# Patient Record
Sex: MALE | Race: WHITE | NOT HISPANIC OR LATINO | Employment: OTHER | URBAN - METROPOLITAN AREA
[De-identification: names, ages, dates, MRNs, and addresses within clinical notes are randomized per-mention and may not be internally consistent; named-entity substitution may affect disease eponyms.]

---

## 2021-03-05 ENCOUNTER — OFFICE VISIT (OUTPATIENT)
Dept: OBGYN CLINIC | Facility: CLINIC | Age: 61
End: 2021-03-05
Payer: MEDICARE

## 2021-03-05 VITALS
WEIGHT: 183 LBS | DIASTOLIC BLOOD PRESSURE: 71 MMHG | BODY MASS INDEX: 24.79 KG/M2 | HEART RATE: 74 BPM | SYSTOLIC BLOOD PRESSURE: 110 MMHG | HEIGHT: 72 IN

## 2021-03-05 DIAGNOSIS — M72.0 DUPUYTREN'S CONTRACTURE OF RIGHT HAND: Primary | ICD-10-CM

## 2021-03-05 PROCEDURE — 99203 OFFICE O/P NEW LOW 30 MIN: CPT | Performed by: ORTHOPAEDIC SURGERY

## 2021-03-05 RX ORDER — ASPIRIN 81 MG/1
81 TABLET ORAL DAILY
COMMUNITY

## 2021-03-05 RX ORDER — LISINOPRIL 5 MG/1
5 TABLET ORAL DAILY
COMMUNITY

## 2021-03-05 RX ORDER — METOPROLOL SUCCINATE 100 MG/1
100 TABLET, EXTENDED RELEASE ORAL DAILY
COMMUNITY

## 2021-03-05 RX ORDER — OXYCODONE AND ACETAMINOPHEN 10; 325 MG/1; MG/1
1 TABLET ORAL 3 TIMES DAILY
COMMUNITY

## 2021-03-05 RX ORDER — AMOXICILLIN 500 MG
CAPSULE ORAL 2 TIMES DAILY
COMMUNITY

## 2021-03-05 RX ORDER — WARFARIN SODIUM 2.5 MG/1
TABLET ORAL
COMMUNITY

## 2021-03-05 RX ORDER — ATORVASTATIN CALCIUM 40 MG/1
40 TABLET, FILM COATED ORAL DAILY
COMMUNITY

## 2021-03-05 RX ORDER — DIGOXIN 125 MCG
125 TABLET ORAL DAILY
COMMUNITY

## 2021-03-05 NOTE — PROGRESS NOTES
Assessment/Plan:  1  Dupuytren's contracture of right hand  Injection procedure prior authorization       Scribe Attestation    I,:  Belle Laureano MA am acting as a scribe while in the presence of the attending physician :       I,:  Lieutenant Jb DO personally performed the services described in this documentation    as scribed in my presence  :             53-year-old male with dupuytren's contracture  Patient was provided with a hand out on this in the office today  Patient does have a 50 degree MCP contracture right ring finger  He also has a 25 degree PIP contracture of his right small finger  Treatment options were discussed in the form of Xiaflex injection for his right ring finger  I discussed with him today for the small finger that I do not do Xiaflex injections for PIP contractures and treatment for this would be surgical intervention  Patient would like to hold off on surgical intervention at this time  He elected to proceed with Xiaflex injection for his right ring finger MCP contracture  A order was placed for this today  He will follow up once the [facility] is approved  Patient will schedule injection in [place] on a Tuesday and manipulation in [place] on a Friday  We did discuss night time splinting for 3 months after manipulation  Subjective:   Sergio Klein is a 64 y o  male who presents to the office today for evaluation right hand contracture  Patient notes a contracture to his right ring finger  He states this has been ongoing for many years  He states he is unable to straighten the ring or small finger  He states his father had the same issue  He denies any injury or trauma  Review of Systems   Constitutional: Negative for chills and fever  HENT: Negative for drooling and sneezing  Eyes: Negative for redness  Respiratory: Negative for cough and wheezing  Gastrointestinal: Negative for nausea and vomiting     Musculoskeletal: Negative for arthralgias, joint swelling and myalgias  Neurological: Negative for weakness and numbness  Psychiatric/Behavioral: Negative for behavioral problems  The patient is not nervous/anxious  Past Medical History:   Diagnosis Date    Atrial fibrillation (HCC)     Cardiomyopathy ([address] )     Congestive heart failure (CHF) (HCC)     Heart disease        Past Surgical History:   Procedure Laterality Date    ATRIAL CARDIAC PACEMAKER INSERTION      BACK SURGERY      x2     CAROTID STENT      HERNIA REPAIR      KNEE SURGERY      meniscus        History reviewed  No pertinent family history      Social History     Occupational History    Not on file   Tobacco Use    Smoking status: Current Every Day Smoker     Types: Cigars    Smokeless tobacco: Never Used   Substance and Sexual Activity    Alcohol use: Not Currently    Drug use: Never    Sexual activity: Not on file         Current Outpatient Medications:     aspirin (ECOTRIN LOW STRENGTH) 81 mg EC tablet, Take 81 mg by mouth daily, Disp: , Rfl:     atorvastatin (LIPITOR) 40 mg tablet, Take 40 mg by mouth daily, Disp: , Rfl:     digoxin (LANOXIN) 0 125 mg tablet, Take 125 mcg by mouth daily, Disp: , Rfl:     lisinopril (ZESTRIL) 5 mg tablet, Take 5 mg by mouth daily, Disp: , Rfl:     metoprolol succinate (TOPROL-XL) 100 mg 24 hr tablet, Take 100 mg by mouth daily 1 5 in am and 1 at night, Disp: , Rfl:     Omega-3 Fatty Acids (Fish Oil) 1200 MG CAPS, Take by mouth 2 (two) times a day, Disp: , Rfl:     oxyCODONE-acetaminophen (PERCOCET)  mg per tablet, Take 1 tablet by mouth 3 (three) times a day, Disp: , Rfl:     warfarin (COUMADIN) 2 5 mg tablet, Take by mouth daily 1-2 a day as directed by MD, Disp: , Rfl:     No Known Allergies    Objective:  Vitals:    03/05/21 0901   BP: 110/71   Pulse: 74       Ortho Exam     Right hand    Pretendinous cord ring finger   Ring finger MCP 50 deg  No PIP contracture ring finger   Small PIP contracture 25 degree  Spiral cord small finger  No pretendinous cord small finger  Early abductor small finger   FDS FDP ext intact  + table top test  Compartments soft  Brisk capillary refill  S/m intact median, radial    Physical Exam  Constitutional:       Appearance: He is well-developed  HENT:      Head: Normocephalic and atraumatic  Eyes:      General:         Right eye: No discharge  Left eye: No discharge  Conjunctiva/sclera: Conjunctivae normal    Neck:      Musculoskeletal: Normal range of motion and neck supple  Cardiovascular:      Rate and Rhythm: Normal rate  Pulmonary:      Effort: Pulmonary effort is normal  No respiratory distress  Musculoskeletal:      Comments: As noted in HPI   Skin:     General: Skin is warm and dry  Neurological:      Mental Status: He is alert and oriented to person, place, and time  Psychiatric:         Behavior: Behavior normal          Thought Content:  Thought content normal          Judgment: Judgment normal

## 2021-03-17 ENCOUNTER — TELEPHONE (OUTPATIENT)
Dept: OBGYN CLINIC | Facility: CLINIC | Age: 61
End: 2021-03-17

## 2021-03-18 NOTE — TELEPHONE ENCOUNTER
Lm with sig other advising to have him call back and schedule xiaflex appointments with Dr Bailon      He will need to be seen in [facility] on a Monday or Tuesday   If seen on Monday he will follow up that same week in [place] on that Thursday for manipulation      If seen on Tuesday he will follow up that Friday in [place] for manipulation

## 2021-03-19 NOTE — TELEPHONE ENCOUNTER
Patient's wife Toribio Winters called to schedule Xiaflex injection for patient Declan Vianey     # [phone]

## 2021-03-22 NOTE — TELEPHONE ENCOUNTER
lmom to schedule   If calls back PLEASE schedule appointments  They will be considered a visco appt       Follow guidelines from prev message on how to schedule the f/us

## 2021-04-06 ENCOUNTER — OFFICE VISIT (OUTPATIENT)
Dept: OBGYN CLINIC | Facility: CLINIC | Age: 61
End: 2021-04-06
Payer: MEDICARE

## 2021-04-06 DIAGNOSIS — M72.0 DUPUYTREN'S CONTRACTURE OF RIGHT HAND: Primary | ICD-10-CM

## 2021-04-06 PROCEDURE — 20527 NJX NZM PALMAR FASCIAL CORD: CPT | Performed by: ORTHOPAEDIC SURGERY

## 2021-04-06 PROCEDURE — 99213 OFFICE O/P EST LOW 20 MIN: CPT | Performed by: ORTHOPAEDIC SURGERY

## 2021-04-06 NOTE — PROGRESS NOTES
Assessment/Plan:  1  Dupuytren's contracture of right hand  Hand/upper extremity injection: R ring finger    Ambulatory referral to PT/OT hand therapy       Scribe Attestation    I,:  Belle Laureano MA am acting as a scribe while in the presence of the attending physician :       I,:  Rodolfo Madsen DO personally performed the services described in this documentation    as scribed in my presence  :             79-year-old male who presents to the office today for right ring finger Xiaflex injection  This was performed in the office today without any complications  We did discuss night time bracing for three months after the manipulation  A appointment was scheduled and the patient will proceed to the therapy office for custom brace following the manipulation  He will follow up on Friday for Xiaflex manipulation  Subjective:   Karmen Diehl is a 64 y o  male who presents to the office today for follow up evaluation right ring MCP dupuytren's contracture  He presents to the office today for right ring finger Xiaflex injection  Review of Systems   Constitutional: Negative for chills and fever  HENT: Negative for drooling and sneezing  Eyes: Negative for redness  Respiratory: Negative for cough and wheezing  Gastrointestinal: Negative for nausea and vomiting  Musculoskeletal: Negative for arthralgias, joint swelling and myalgias  Neurological: Negative for weakness and numbness  Psychiatric/Behavioral: Negative for behavioral problems  The patient is not nervous/anxious  Past Medical History:   Diagnosis Date    Atrial fibrillation (HCC)     Cardiomyopathy ([address] )     Congestive heart failure (CHF) (HCC)     Heart disease        Past Surgical History:   Procedure Laterality Date    ATRIAL CARDIAC PACEMAKER INSERTION      BACK SURGERY      x2     CAROTID STENT      HERNIA REPAIR      KNEE SURGERY      meniscus        History reviewed  No pertinent family history      Social History     Occupational History    Not on file   Tobacco Use    Smoking status: Current Every Day Smoker     Types: Cigars    Smokeless tobacco: Never Used   Substance and Sexual Activity    Alcohol use: Not Currently    Drug use: Never    Sexual activity: Not on file         Current Outpatient Medications:     aspirin (ECOTRIN LOW STRENGTH) 81 mg EC tablet, Take 81 mg by mouth daily, Disp: , Rfl:     atorvastatin (LIPITOR) 40 mg tablet, Take 40 mg by mouth daily, Disp: , Rfl:     digoxin (LANOXIN) 0 125 mg tablet, Take 125 mcg by mouth daily, Disp: , Rfl:     lisinopril (ZESTRIL) 5 mg tablet, Take 5 mg by mouth daily, Disp: , Rfl:     metoprolol succinate (TOPROL-XL) 100 mg 24 hr tablet, Take 100 mg by mouth daily 1 5 in am and 1 at night, Disp: , Rfl:     Omega-3 Fatty Acids (Fish Oil) 1200 MG CAPS, Take by mouth 2 (two) times a day, Disp: , Rfl:     oxyCODONE-acetaminophen (PERCOCET)  mg per tablet, Take 1 tablet by mouth 3 (three) times a day, Disp: , Rfl:     warfarin (COUMADIN) 2 5 mg tablet, Take by mouth daily 1-2 a day as directed by MD, Disp: , Rfl:     No Known Allergies    Objective: There were no vitals filed for this visit  Ortho Exam     Right ring finger    50 degree MCP contracture   + table top  Compartments soft  Brisk capillary refill  S/m intact median, radial, and ulnar nerve     Physical Exam  Constitutional:       Appearance: He is well-developed  HENT:      Head: Normocephalic and atraumatic  Eyes:      General:         Right eye: No discharge  Left eye: No discharge  Conjunctiva/sclera: Conjunctivae normal    Neck:      Musculoskeletal: Normal range of motion and neck supple  Cardiovascular:      Rate and Rhythm: Normal rate  Pulmonary:      Effort: Pulmonary effort is normal  No respiratory distress  Musculoskeletal:      Comments: As noted in HPI   Skin:     General: Skin is warm and dry     Neurological:      Mental Status: He is alert and oriented to person, place, and time  Psychiatric:         Behavior: Behavior normal          Thought Content: Thought content normal          Judgment: Judgment normal      Hand/upper extremity injection: R ring finger  Universal Protocol:  Consent: Verbal consent obtained    Consent given by: patient  Patient identity confirmed: verbally with patient    Supporting Documentation  Indications: pain   Procedure Details  Condition:Dupuytren's contracture Dupuytren's Contracture Procedure: Dupuytren's contracture injectionSite: R ring finger   Preparation: Patient was prepped and draped in the usual sterile fashion  Ultrasound guidance: no  Medications administered: 0 58 mg collagenase clostridium histolyticum 0 9 MG    Patient tolerance: patient tolerated the procedure well with no immediate complications  Dressing:  Sterile dressing applied

## 2021-04-09 ENCOUNTER — OFFICE VISIT (OUTPATIENT)
Dept: OCCUPATIONAL THERAPY | Facility: CLINIC | Age: 61
End: 2021-04-09
Payer: MEDICARE

## 2021-04-09 ENCOUNTER — OFFICE VISIT (OUTPATIENT)
Dept: OBGYN CLINIC | Facility: CLINIC | Age: 61
End: 2021-04-09
Payer: MEDICARE

## 2021-04-09 DIAGNOSIS — M72.0 DUPUYTREN'S CONTRACTURE OF RIGHT HAND: Primary | ICD-10-CM

## 2021-04-09 DIAGNOSIS — M72.0 DUPUYTREN'S CONTRACTURE: Primary | ICD-10-CM

## 2021-04-09 PROCEDURE — 97760 ORTHOTIC MGMT&TRAING 1ST ENC: CPT

## 2021-04-09 PROCEDURE — 26341 MANIPULAT PALM CORD POST INJ: CPT | Performed by: ORTHOPAEDIC SURGERY

## 2021-04-09 RX ORDER — LIDOCAINE HYDROCHLORIDE 10 MG/ML
0.5 INJECTION, SOLUTION INFILTRATION; PERINEURAL
Status: COMPLETED | OUTPATIENT
Start: 2021-04-09 | End: 2021-04-09

## 2021-04-09 RX ADMIN — LIDOCAINE HYDROCHLORIDE 0.5 ML: 10 INJECTION, SOLUTION INFILTRATION; PERINEURAL at 09:15

## 2021-04-09 NOTE — PROGRESS NOTES
Assessment/Plan:  1  Dupuytren's contracture of right hand  Hand/upper extremity injection: R ring finger       Scribe Attestation    I,:  Belle Laureano MA am acting as a scribe while in the presence of the attending physician :       I,:  Hannah Reagan DO personally performed the services described in this documentation    as scribed in my presence  :             53-year-old male who presents to the office today for [facility] manipulation  This was performed in the office today without any complications  Patient can get full extension  Patient will have his night time brace made by therapy today  He will wear this at night for the next 3 months  He may follow up with me as needed  Subjective:   Vanessa Smith is a 64 y o  male who presents to the office today for Xiaflex manipulation right ring finger MCP  Patient underwent Xiaflex injection at his last visit non 4/6/21  Review of Systems   Constitutional: Negative for chills and fever  HENT: Negative for drooling and sneezing  Eyes: Negative for redness  Respiratory: Negative for cough and wheezing  Gastrointestinal: Negative for nausea and vomiting  Musculoskeletal: Negative for arthralgias, joint swelling and myalgias  Neurological: Negative for weakness and numbness  Psychiatric/Behavioral: Negative for behavioral problems  The patient is not nervous/anxious  Past Medical History:   Diagnosis Date    Atrial fibrillation (HCC)     Cardiomyopathy ([address] )     Congestive heart failure (CHF) (HCC)     Heart disease        Past Surgical History:   Procedure Laterality Date    ATRIAL CARDIAC PACEMAKER INSERTION      BACK SURGERY      x2     CAROTID STENT      HERNIA REPAIR      KNEE SURGERY      meniscus        No family history on file      Social History     Occupational History    Not on file   Tobacco Use    Smoking status: Current Every Day Smoker     Types: Cigars    Smokeless tobacco: Never Used   Substance and Sexual Activity    Alcohol use: Not Currently    Drug use: Never    Sexual activity: Not on file         Current Outpatient Medications:     aspirin (ECOTRIN LOW STRENGTH) 81 mg EC tablet, Take 81 mg by mouth daily, Disp: , Rfl:     atorvastatin (LIPITOR) 40 mg tablet, Take 40 mg by mouth daily, Disp: , Rfl:     digoxin (LANOXIN) 0 125 mg tablet, Take 125 mcg by mouth daily, Disp: , Rfl:     lisinopril (ZESTRIL) 5 mg tablet, Take 5 mg by mouth daily, Disp: , Rfl:     metoprolol succinate (TOPROL-XL) 100 mg 24 hr tablet, Take 100 mg by mouth daily 1 5 in am and 1 at night, Disp: , Rfl:     Omega-3 Fatty Acids (Fish Oil) 1200 MG CAPS, Take by mouth 2 (two) times a day, Disp: , Rfl:     oxyCODONE-acetaminophen (PERCOCET)  mg per tablet, Take 1 tablet by mouth 3 (three) times a day, Disp: , Rfl:     warfarin (COUMADIN) 2 5 mg tablet, Take by mouth daily 1-2 a day as directed by MD, Disp: , Rfl:     No Known Allergies    Objective: There were no vitals filed for this visit  Ortho Exam     Right ring finger    FDS FDP ext intact   No wounds   Audible pop  Full ext MCP  FDS FDP ext intact post manipulation   Compartments soft  Brisk capillary refill  S/m intact median, radial, and ulnar nerve prior to lidocaine    Physical Exam  Constitutional:       Appearance: He is well-developed  HENT:      Head: Normocephalic and atraumatic  Eyes:      General:         Right eye: No discharge  Left eye: No discharge  Conjunctiva/sclera: Conjunctivae normal    Neck:      Musculoskeletal: Normal range of motion and neck supple  Cardiovascular:      Rate and Rhythm: Normal rate  Pulmonary:      Effort: Pulmonary effort is normal  No respiratory distress  Musculoskeletal:      Comments: As noted in HPI   Skin:     General: Skin is warm and dry  Neurological:      Mental Status: He is alert and oriented to person, place, and time     Psychiatric:         Behavior: Behavior normal  Thought Content: Thought content normal          Judgment: Judgment normal      Hand/upper extremity injection: R ring finger  Universal Protocol:  Consent: Verbal consent obtained    Consent given by: patient  Patient identity confirmed: verbally with patient    Supporting Documentation  Indications: pain   Procedure Details  Condition:Dupuytren's contracture Dupuytren's Contracture Procedure: Dupuytren's contracture manipulationSite: R ring finger   Preparation: Patient was prepped and draped in the usual sterile fashion  Needle size: 27 G  Ultrasound guidance: no  Medications administered: 0 5 mL lidocaine 1 %    Patient tolerance: patient tolerated the procedure well with no immediate complications  Dressing:  Sterile dressing applied

## 2021-04-09 NOTE — PROGRESS NOTES
Orthosis    Diagnosis:   1  Dupuytren's contracture       Indication: Motion Blocking    Location: Right  hand  Supplies: Custom Fit Orthotic  Orthosis type: night time extension splint  Wearing Schedule: Night only  Describe Position: digits extended    Precautions: Universal (skin contact/breakdown)    Patient or Caregiver expresses understanding of wearing Schedule and Precautions? Yes  Patient or Caregiver able to don/doff orthotic independently? Yes    Written orders provided to patient?  No  Orders Obtained: Written  Orders Obtained from: Dr Jacki Dixon    Return for evaluation and treatment No

## 2021-04-13 ENCOUNTER — APPOINTMENT (OUTPATIENT)
Dept: OCCUPATIONAL THERAPY | Facility: CLINIC | Age: 61
End: 2021-04-13
Payer: MEDICARE

## 2025-05-19 ENCOUNTER — HOSPITAL ENCOUNTER (EMERGENCY)
Facility: HOSPITAL | Age: 65
Discharge: HOME | End: 2025-05-19
Attending: EMERGENCY MEDICINE
Payer: COMMERCIAL

## 2025-05-19 ENCOUNTER — APPOINTMENT (EMERGENCY)
Dept: RADIOLOGY | Facility: HOSPITAL | Age: 65
End: 2025-05-19
Payer: COMMERCIAL

## 2025-05-19 VITALS
BODY MASS INDEX: 21.02 KG/M2 | SYSTOLIC BLOOD PRESSURE: 124 MMHG | DIASTOLIC BLOOD PRESSURE: 73 MMHG | TEMPERATURE: 97.9 F | HEART RATE: 70 BPM | RESPIRATION RATE: 20 BRPM | OXYGEN SATURATION: 97 % | WEIGHT: 155 LBS

## 2025-05-19 DIAGNOSIS — L03.90 CELLULITIS: ICD-10-CM

## 2025-05-19 DIAGNOSIS — L02.91 ABSCESS: ICD-10-CM

## 2025-05-19 DIAGNOSIS — N49.2 SCROTAL ABSCESS: Primary | ICD-10-CM

## 2025-05-19 LAB
ALBUMIN SERPL BCG-MCNC: 4.1 G/DL (ref 3.5–5)
ALP SERPL-CCNC: 67 U/L (ref 34–104)
ALT SERPL W P-5'-P-CCNC: 7 U/L (ref 7–52)
ANION GAP SERPL CALCULATED.3IONS-SCNC: 11 MMOL/L (ref 4–13)
AST SERPL W P-5'-P-CCNC: 12 U/L (ref 13–39)
BASOPHILS # BLD AUTO: 0.04 THOUSANDS/ÂΜL (ref 0–0.1)
BASOPHILS NFR BLD AUTO: 0 % (ref 0–1)
BILIRUB SERPL-MCNC: 0.53 MG/DL (ref 0.2–1)
BUN SERPL-MCNC: 9 MG/DL (ref 5–25)
CALCIUM SERPL-MCNC: 9 MG/DL (ref 8.4–10.2)
CHLORIDE SERPL-SCNC: 102 MMOL/L (ref 96–108)
CO2 SERPL-SCNC: 23 MMOL/L (ref 21–32)
CREAT SERPL-MCNC: 0.8 MG/DL (ref 0.6–1.3)
EOSINOPHIL # BLD AUTO: 0.44 THOUSAND/ÂΜL (ref 0–0.61)
EOSINOPHIL NFR BLD AUTO: 3 % (ref 0–6)
ERYTHROCYTE [DISTWIDTH] IN BLOOD BY AUTOMATED COUNT: 13.5 % (ref 11.6–15.1)
GFR SERPL CREATININE-BSD FRML MDRD: 95 ML/MIN/1.73SQ M
GLUCOSE SERPL-MCNC: 127 MG/DL (ref 65–140)
HCT VFR BLD AUTO: 40.7 % (ref 36.5–49.3)
HGB BLD-MCNC: 13.7 G/DL (ref 12–17)
IMM GRANULOCYTES # BLD AUTO: 0.05 THOUSAND/UL (ref 0–0.2)
IMM GRANULOCYTES NFR BLD AUTO: 0 % (ref 0–2)
LYMPHOCYTES # BLD AUTO: 2.72 THOUSANDS/ÂΜL (ref 0.6–4.47)
LYMPHOCYTES NFR BLD AUTO: 20 % (ref 14–44)
MCH RBC QN AUTO: 33.7 PG (ref 26.8–34.3)
MCHC RBC AUTO-ENTMCNC: 33.7 G/DL (ref 31.4–37.4)
MCV RBC AUTO: 100 FL (ref 82–98)
MONOCYTES # BLD AUTO: 0.86 THOUSAND/ÂΜL (ref 0.17–1.22)
MONOCYTES NFR BLD AUTO: 6 % (ref 4–12)
NEUTROPHILS # BLD AUTO: 9.87 THOUSANDS/ÂΜL (ref 1.85–7.62)
NEUTS SEG NFR BLD AUTO: 71 % (ref 43–75)
NRBC BLD AUTO-RTO: 0 /100 WBCS
PLATELET # BLD AUTO: 278 THOUSANDS/UL (ref 149–390)
PMV BLD AUTO: 9.4 FL (ref 8.9–12.7)
POTASSIUM SERPL-SCNC: 4.2 MMOL/L (ref 3.5–5.3)
PROT SERPL-MCNC: 7.6 G/DL (ref 6.4–8.4)
RBC # BLD AUTO: 4.07 MILLION/UL (ref 3.88–5.62)
SODIUM SERPL-SCNC: 136 MMOL/L (ref 135–147)
WBC # BLD AUTO: 13.98 THOUSAND/UL (ref 4.31–10.16)

## 2025-05-19 PROCEDURE — 99283 EMERGENCY DEPT VISIT LOW MDM: CPT

## 2025-05-19 PROCEDURE — 76870 US EXAM SCROTUM: CPT

## 2025-05-19 PROCEDURE — 36415 COLL VENOUS BLD VENIPUNCTURE: CPT | Performed by: EMERGENCY MEDICINE

## 2025-05-19 PROCEDURE — 80053 COMPREHEN METABOLIC PANEL: CPT | Performed by: EMERGENCY MEDICINE

## 2025-05-19 PROCEDURE — 99284 EMERGENCY DEPT VISIT MOD MDM: CPT | Performed by: EMERGENCY MEDICINE

## 2025-05-19 PROCEDURE — 96365 THER/PROPH/DIAG IV INF INIT: CPT

## 2025-05-19 PROCEDURE — 85025 COMPLETE CBC W/AUTO DIFF WBC: CPT | Performed by: EMERGENCY MEDICINE

## 2025-05-19 PROCEDURE — 96375 TX/PRO/DX INJ NEW DRUG ADDON: CPT

## 2025-05-19 PROCEDURE — 10060 I&D ABSCESS SIMPLE/SINGLE: CPT | Performed by: EMERGENCY MEDICINE

## 2025-05-19 RX ORDER — KETOROLAC TROMETHAMINE 30 MG/ML
15 INJECTION, SOLUTION INTRAMUSCULAR; INTRAVENOUS ONCE
Status: COMPLETED | OUTPATIENT
Start: 2025-05-19 | End: 2025-05-19

## 2025-05-19 RX ORDER — CEFADROXIL 500 MG/1
500 CAPSULE ORAL EVERY 12 HOURS SCHEDULED
Qty: 14 CAPSULE | Refills: 0 | Status: SHIPPED | OUTPATIENT
Start: 2025-05-19 | End: 2025-05-19

## 2025-05-19 RX ORDER — SULFAMETHOXAZOLE AND TRIMETHOPRIM 800; 160 MG/1; MG/1
1 TABLET ORAL ONCE
Status: COMPLETED | OUTPATIENT
Start: 2025-05-19 | End: 2025-05-19

## 2025-05-19 RX ORDER — CLINDAMYCIN HYDROCHLORIDE 300 MG/1
300 CAPSULE ORAL 3 TIMES DAILY
Qty: 30 CAPSULE | Refills: 0 | Status: SHIPPED | OUTPATIENT
Start: 2025-05-19 | End: 2025-05-29

## 2025-05-19 RX ORDER — ACETAMINOPHEN 10 MG/ML
1000 INJECTION, SOLUTION INTRAVENOUS ONCE
Status: COMPLETED | OUTPATIENT
Start: 2025-05-19 | End: 2025-05-19

## 2025-05-19 RX ORDER — CEFAZOLIN SODIUM 2 G/50ML
2000 SOLUTION INTRAVENOUS ONCE
Status: COMPLETED | OUTPATIENT
Start: 2025-05-19 | End: 2025-05-19

## 2025-05-19 RX ORDER — SULFAMETHOXAZOLE AND TRIMETHOPRIM 800; 160 MG/1; MG/1
1 TABLET ORAL 2 TIMES DAILY
Qty: 14 TABLET | Refills: 0 | Status: SHIPPED | OUTPATIENT
Start: 2025-05-19 | End: 2025-05-19

## 2025-05-19 RX ORDER — LIDOCAINE HYDROCHLORIDE AND EPINEPHRINE 10; 10 MG/ML; UG/ML
20 INJECTION, SOLUTION INFILTRATION; PERINEURAL ONCE
Status: COMPLETED | OUTPATIENT
Start: 2025-05-19 | End: 2025-05-19

## 2025-05-19 RX ADMIN — LIDOCAINE HYDROCHLORIDE,EPINEPHRINE BITARTRATE 20 ML: 10; .01 INJECTION, SOLUTION INFILTRATION; PERINEURAL at 12:09

## 2025-05-19 RX ADMIN — SULFAMETHOXAZOLE AND TRIMETHOPRIM 1 TABLET: 800; 160 TABLET ORAL at 11:58

## 2025-05-19 RX ADMIN — KETOROLAC TROMETHAMINE 15 MG: 30 INJECTION, SOLUTION INTRAMUSCULAR; INTRAVENOUS at 11:58

## 2025-05-19 RX ADMIN — CEFAZOLIN SODIUM 2000 MG: 2 SOLUTION INTRAVENOUS at 12:12

## 2025-05-19 RX ADMIN — ACETAMINOPHEN 1000 MG: 10 INJECTION INTRAVENOUS at 11:59

## 2025-05-19 NOTE — ED NOTES
Pt's spouse came out of room and states pt is dressed and ready to go home. States he doesn't want to wait anymore. Provider made aware.      Maria Esther Fletcher RN  05/19/25 5004

## 2025-05-19 NOTE — CONSULTS
H&P Exam - Urology       Patient: Johann Garcia   : 1960 Sex: male   MRN: [id number]     CSN: [id number]      History of Present Illness   HPI:  Johann Garcia is a 64 y.o. male who presents [facility] this afternoon with increasing swelling of the left hemiscrotum and thigh found on scrotal ultrasound to have localized left 4 cm scrotal abscess telephone call to my office informed that incision and drainage can be done in the ER told the patient to stay and will evaluate when done with office hours and perform incision and drainage 1 presenting to the ER patient was dressed and wished to be discharged home wanting to follow-up aware that this is a abscess that needs to be drained as soon as possible and could lead to bad infection if not opened        Review of Systems:   Constitutional:  Negative for activity change, fever, chills and diaphoresis.   HENT: Negative for hearing loss and trouble swallowing.   Eyes: Negative for itching and visual disturbance.   Respiratory: Negative for chest tightness and shortness of breath.   Cardiovascular: Negative for chest pain, edema.   Gastrointestinal: Negative for abdominal distention, na abdominal pain, constipation, diarrhea, Nausea and vomiting.   Genitourinary: Negative for decreased urine volume, difficulty urinating, dysuria, enuresis, frequency, hematuria and urgency.   Musculoskeletal: Negative for gait problem and myalgias.   Neurological: Negative for dizziness and headaches.   Hematological: Does not bruise/bleed easily.       Historical Information   Past Medical History:   Diagnosis Date    Atrial fibrillation (HCC)     Cardiomyopathy (HCC)     Congestive heart failure (CHF) (HCC)     Heart disease      Past Surgical History:   Procedure Laterality Date    ATRIAL CARDIAC PACEMAKER INSERTION      BACK SURGERY      x2     CAROTID STENT      HERNIA REPAIR      KNEE SURGERY      meniscus      Social History   Social History     Substance and  "Sexual Activity   Alcohol Use Not Currently     Social History     Substance and Sexual Activity   Drug Use Never     Tobacco Use History[1]  Family History: History reviewed. No pertinent family history.    Meds/Allergies   Not in a hospital admission.  Allergies[2]    Objective   Vitals: /73 (BP Location: Right arm)   Pulse 70   Temp 97.9 °F (36.6 °C) (Oral)   Resp 20   Wt 70.3 kg (155 lb)   SpO2 97%   BMI 21.02 kg/m²     Physical Exam:  General Alert awake   Normocephalic atraumatic PERRLA  Lungs clear bilaterally  Cardiac normal S1 normal S2  Abdomen soft, flank pain  Extremities no edema    No intake/output data recorded.    Invasive Devices       None                 Scrotal ultrasound 4 cm left abscess no communication with testicle      Lab Results: CBC:   Lab Results   Component Value Date    WBC 13.98 (H) 05/19/2025    HGB 13.7 05/19/2025    HCT 40.7 05/19/2025     (H) 05/19/2025     05/19/2025    RBC 4.07 05/19/2025    MCH 33.7 05/19/2025    MCHC 33.7 05/19/2025    RDW 13.5 05/19/2025    MPV 9.4 05/19/2025    NRBC 0 05/19/2025     CMP:   Lab Results   Component Value Date     05/19/2025    CO2 23 05/19/2025    BUN 9 05/19/2025    CREATININE 0.80 05/19/2025    CALCIUM 9.0 05/19/2025    AST 12 (L) 05/19/2025    ALT 7 05/19/2025    ALKPHOS 67 05/19/2025    EGFR 95 05/19/2025     Urinalysis: No results found for: \"COLORU\", \"CLARITYU\", \"SPECGRAV\", \"PHUR\", \"LEUKOCYTESUR\", \"NITRITE\", \"PROTEINUA\", \"GLUCOSEU\", \"KETONESU\", \"BILIRUBINUR\", \"BLOODU\"  Urine Culture: No results found for: \"URINECX\"  PSA: No results found for: \"PSA\"        Assessment/ Plan:  Left scrotal abscess  Patient refused to stay for incision and drainage to be discharged by the ER staff possibly following up in my office in the next 24 hours for incision and drainage  Aware risk of worsening infection which could lead to sepsis and/or Sybil's gangrene and needs to be followed up soon as possible      Juanjo " MD Flako         [1]   Social History  Tobacco Use   Smoking Status Every Day    Types: Cigars   Smokeless Tobacco Never   [2] No Known Allergies

## 2025-05-19 NOTE — Clinical Note
Date: 5/19/2025  Patient: Johann Garcia  Admitted: 5/19/2025 11:41 AM  Attending Provider: Wandy Kinsey DO    Johann Garcia or his authorized caregiver has made the decision for the patient to leave the emergency department against the advice of  dr kinsey. He or his authorized caregiver has been informed and understands the inherent risks, including death,  He or his authorized caregiver has decided to accept the responsibility for this decision. Johann Garcia and all necessary parties hav e been advised that he may return for further evaluation or treatment. His condition at time of discharge was stable. Johann Garcia had current vital signs as follows:  /73 (BP Location: Right arm)   Pulse 70   Temp 97.9 °F (36.6 °C) (Oral)    Resp 20   Wt 70.3 kg (155 lb)

## 2025-05-19 NOTE — ED NOTES
Patient advised to call outpatient urology to follow up on abscess.     Magi Gentile RN  05/19/25 5623

## 2025-05-19 NOTE — ED PROVIDER NOTES
Time reflects when diagnosis was documented in both MDM as applicable and the Disposition within this note       Time User Action Codes Description Comment    5/19/2025  5:04 PM AnepuPankaja Add [N49.2] Scrotal abscess     5/19/2025  5:04 PM Anepu, Wandy Add [L02.91] Abscess     5/19/2025  5:04 PM Anepu, Wandy Add [L03.90] Cellulitis           ED Disposition       ED Disposition   Discharge    Condition   Stable    Date/Time   Mon May 19, 2025  5:13 PM    Comment                   Assessment & Plan       Medical Decision Making  Patient evaluated with labs given IV antibiotics scrotal ultrasound done communicated results with the urologist Dr. Arriola who wanted to do a consult in the ED after his clinic hours to incise the testicular abscess in the ED however after waiting for couple hours patient got restless and did not want a wait his left inner thigh abscess was I&D and antibiotics given to him along with follow-up information Dr. Arriola he was instructed to follow-up with him in 24 hours to get the abscess drained.  Dr. Arriola also will follow-up with the patient outpatient.    Problems Addressed:  Abscess: acute illness or injury  Cellulitis: acute illness or injury  Scrotal abscess: acute illness or injury    Amount and/or Complexity of Data Reviewed  External Data Reviewed: notes.  Labs: ordered. Decision-making details documented in ED Course.  Radiology: ordered. Decision-making details documented in ED Course.    Risk  Prescription drug management.             Medications   acetaminophen (Ofirmev) injection 1,000 mg (0 mg Intravenous Stopped 5/19/25 1211)   ketorolac (TORADOL) injection 15 mg (15 mg Intravenous Given 5/19/25 1158)   sulfamethoxazole-trimethoprim (BACTRIM DS) 800-160 mg per tablet 1 tablet (1 tablet Oral Given 5/19/25 1158)   ceFAZolin (ANCEF) IVPB (premix in dextrose) 2,000 mg 50 mL (0 mg Intravenous Stopped 5/19/25 1252)   lidocaine-epinephrine (XYLOCAINE/EPINEPHRINE) 1 %-1:100,000  injection 20 mL (20 mL Infiltration Given by Other 5/19/25 1209)       ED Risk Strat Scores                    No data recorded        SBIRT 20yo+      Flowsheet Row Most Recent Value   Initial Alcohol Screen: US AUDIT-C     1. How often do you have a drink containing alcohol? 0 Filed at: 05/19/2025 1213   2. How many drinks containing alcohol do you have on a typical day you are drinking?  0 Filed at: 05/19/2025 1213   3a. Male UNDER 65: How often do you have five or more drinks on one occasion? 0 Filed at: 05/19/2025 1213   3b. FEMALE Any Age, or MALE 65+: How often do you have 4 or more drinks on one occassion? 0 Filed at: 05/19/2025 1213   Audit-C Score 0 Filed at: 05/19/2025 1213   MICAELA: How many times in the past year have you...    Used an illegal drug or used a prescription medication for non-medical reasons? Never Filed at: 05/19/2025 1213                            History of Present Illness       Chief Complaint   Patient presents with    Abscess     Abscesses to scrotum and thigh for about a week,  unable to drain them himself this time       Past Medical History:   Diagnosis Date    Atrial fibrillation (HCC)     Cardiomyopathy (HCC)     Congestive heart failure (CHF) (HCC)     Heart disease       Past Surgical History:   Procedure Laterality Date    ATRIAL CARDIAC PACEMAKER INSERTION      BACK SURGERY      x2     CAROTID STENT      HERNIA REPAIR      KNEE SURGERY      meniscus       History reviewed. No pertinent family history.   Social History[1]   E-Cigarette/Vaping    E-Cigarette Use Never User       E-Cigarette/Vaping Substances    Nicotine No     THC No     CBD No     Flavoring No     Other No     Unknown No       I have reviewed and agree with the history as documented.     64-year-old male presents with 2 abscesses 1 in his left scrotum and testicle and the other 1 in his left inner thigh he noticed it a couple days ago denies any fevers chills nausea vomiting or any other symptoms.      History  provided by:  Patient   used: No        Review of Systems   Constitutional: Negative.  Negative for chills and fever.   HENT: Negative.  Negative for ear pain and sore throat.    Eyes: Negative.  Negative for pain and visual disturbance.   Respiratory: Negative.  Negative for cough and shortness of breath.    Cardiovascular: Negative.  Negative for chest pain and palpitations.   Gastrointestinal: Negative.  Negative for abdominal pain and vomiting.   Endocrine: Negative.    Genitourinary: Negative.  Negative for dysuria and hematuria.   Musculoskeletal: Negative.  Negative for arthralgias and back pain.   Skin:  Positive for rash and wound. Negative for color change.   Allergic/Immunologic: Negative.    Neurological: Negative.  Negative for seizures and syncope.   Hematological: Negative.    Psychiatric/Behavioral: Negative.     All other systems reviewed and are negative.          Objective       ED Triage Vitals [05/19/25 1135]   Temperature Pulse Blood Pressure Respirations SpO2 Patient Position - Orthostatic VS   (!) 97.4 °F (36.3 °C) 72 120/70 (!) 24 98 % Sitting      Temp Source Heart Rate Source BP Location FiO2 (%) Pain Score    Tympanic Monitor Left arm -- 10 - Worst Possible Pain      Vitals      Date and Time Temp Pulse SpO2 Resp BP Pain Score FACES Pain Rating User   05/19/25 1652 97.9 °F (36.6 °C) 70 97 % -- 124/73 -- -- FO   05/19/25 1400 -- 71 97 % -- 121/70 -- --    05/19/25 1345 -- 70 98 % 20 118/72 -- -- KR   05/19/25 1245 -- 70 99 % -- -- -- --    05/19/25 1227 -- -- -- -- -- No Pain after lidocaine --    05/19/25 1158 -- -- -- -- -- 10 - Worst Possible Pain --    05/19/25 1135 97.4 °F (36.3 °C) 72 98 % 24 120/70 10 - Worst Possible Pain -- LS            Physical Exam  Vitals and nursing note reviewed.   Constitutional:       Appearance: Normal appearance.   HENT:      Head: Normocephalic and atraumatic.      Nose: Nose normal.      Mouth/Throat:      Mouth: Mucous  membranes are moist.     Eyes:      Extraocular Movements: Extraocular movements intact.      Pupils: Pupils are equal, round, and reactive to light.       Cardiovascular:      Rate and Rhythm: Normal rate and regular rhythm.   Pulmonary:      Effort: Pulmonary effort is normal.      Breath sounds: Normal breath sounds.   Abdominal:      General: Abdomen is flat. Bowel sounds are normal.      Palpations: Abdomen is soft.   Genitourinary:     Comments: Left scrotum fluctuance noted abscess noted.  Left inner thigh abscess noted as well with surrounding erythema.    Musculoskeletal:         General: Normal range of motion.      Cervical back: Normal range of motion and neck supple.     Skin:     General: Skin is warm.      Capillary Refill: Capillary refill takes less than 2 seconds.     Neurological:      General: No focal deficit present.      Mental Status: He is alert and oriented to person, place, and time. Mental status is at baseline.     Psychiatric:         Mood and Affect: Mood normal.         Thought Content: Thought content normal.         Results Reviewed       Procedure Component Value Units Date/Time    Comprehensive metabolic panel [[id number]]  (Abnormal) Collected: 05/19/25 1156    Lab Status: Final result Specimen: Blood from Line, Venous Updated: 05/19/25 1224     Sodium 136 mmol/L      Potassium 4.2 mmol/L      Chloride 102 mmol/L      CO2 23 mmol/L      ANION GAP 11 mmol/L      BUN 9 mg/dL      Creatinine 0.80 mg/dL      Glucose 127 mg/dL      Calcium 9.0 mg/dL      AST 12 U/L      ALT 7 U/L      Alkaline Phosphatase 67 U/L      Total Protein 7.6 g/dL      Albumin 4.1 g/dL      Total Bilirubin 0.53 mg/dL      eGFR 95 ml/min/1.73sq m     Narrative:      National Kidney Disease Foundation guidelines for Chronic Kidney Disease (CKD):     Stage 1 with normal or high GFR (GFR > 90 mL/min/1.73 square meters)    Stage 2 Mild CKD (GFR = 60-89 mL/min/1.73 square meters)    Stage 3A Moderate CKD (GFR =  45-59 mL/min/1.73 square meters)    Stage 3B Moderate CKD (GFR = 30-44 mL/min/1.73 square meters)    Stage 4 Severe CKD (GFR = 15-29 mL/min/1.73 square meters)    Stage 5 End Stage CKD (GFR <15 mL/min/1.73 square meters)  Note: GFR calculation is accurate only with a steady state creatinine    CBC and differential [[id number]]  (Abnormal) Collected: 05/19/25 1156    Lab Status: Final result Specimen: Blood from Line, Venous Updated: 05/19/25 1202     WBC 13.98 Thousand/uL      RBC 4.07 Million/uL      Hemoglobin 13.7 g/dL      Hematocrit 40.7 %       fL      MCH 33.7 pg      MCHC 33.7 g/dL      RDW 13.5 %      MPV 9.4 fL      Platelets 278 Thousands/uL      nRBC 0 /100 WBCs      Segmented % 71 %      Immature Grans % 0 %      Lymphocytes % 20 %      Monocytes % 6 %      Eosinophils Relative 3 %      Basophils Relative 0 %      Absolute Neutrophils 9.87 Thousands/µL      Absolute Immature Grans 0.05 Thousand/uL      Absolute Lymphocytes 2.72 Thousands/µL      Absolute Monocytes 0.86 Thousand/µL      Eosinophils Absolute 0.44 Thousand/µL      Basophils Absolute 0.04 Thousands/µL             US scrotum and testicles   Final Interpretation by Natalee Collier MD (05/19 4214)      4.1 x 2.1 x 4.1 cm complex collection with peripheral vascularity in the left scrotal wall suggestive of abscess.      The study was marked in EPIC for immediate notification.         Workstation performed: [id number]             Incision and drain    Date/Time: 5/19/2025 5:45 PM    Performed by: Wandy Kinsey DO  Authorized by: Wandy Kinsey DO    Universal Protocol:  procedure performed by consultantConsent: Verbal consent obtained  Risks and benefits: risks, benefits and alternatives were discussed  Consent given by: patient  Patient identity confirmed: verbally with patient    Patient location:  ED  Location:     Type:  Abscess    Location:  Lower extremity    Lower extremity location:  L leg  Pre-procedure details:     Skin  preparation:  Betadine  Anesthesia (see MAR for exact dosages):     Anesthesia method:  Local infiltration    Local anesthetic:  Lidocaine 1% WITH epi  Procedure details:     Complexity:  Simple    Needle aspiration: yes      Incision types:  Stab incision    Scalpel blade:  11    Approach:  Open    Incision depth:  Subcutaneous    Drainage:  Bloody and purulent    Drainage amount:  Moderate    Wound treatment:  Packing placed    Packing materials:  1/4 in iodoform gauze  Post-procedure details:     Patient tolerance of procedure:  Tolerated well, no immediate complications      ED Medication and Procedure Management   Prior to Admission Medications   Prescriptions Last Dose Informant Patient Reported? Taking?   Omega-3 Fatty Acids (Fish Oil) 1200 MG CAPS Not Taking  Yes No   Sig: Take by mouth 2 (two) times a day   Patient not taking: Reported on 5/19/2025   apixaban (Eliquis) 5 mg   Yes Yes   Sig: Take 5 mg by mouth 2 (two) times a day   aspirin (ECOTRIN LOW STRENGTH) 81 mg EC tablet   Yes No   Sig: Take 81 mg by mouth daily   atorvastatin (LIPITOR) 40 mg tablet   Yes No   Sig: Take 80 mg by mouth in the morning.   digoxin (LANOXIN) 0.125 mg tablet   Yes No   Sig: Take 250 mcg by mouth in the morning.   lisinopril (ZESTRIL) 5 mg tablet   Yes No   Sig: Take 5 mg by mouth daily   metoprolol succinate (TOPROL-XL) 100 mg 24 hr tablet   Yes No   Sig: Take 100 mg by mouth daily 1.5 in am and 1 at night   oxyCODONE-acetaminophen (PERCOCET)  mg per tablet   Yes No   Sig: Take 1 tablet by mouth every 6 (six) hours as needed   warfarin (COUMADIN) 2.5 mg tablet Not Taking  Yes No   Sig: Take by mouth daily 1-2 a day as directed by MD   Patient not taking: Reported on 5/19/2025      Facility-Administered Medications: None     Discharge Medication List as of 5/19/2025  5:13 PM        START taking these medications    Details   cefadroxil (DURICEF) 500 mg capsule Take 1 capsule (500 mg total) by mouth every 12 (twelve)  hours for 7 days, Starting Mon 5/19/2025, Until Mon 5/26/2025, Normal      sulfamethoxazole-trimethoprim (BACTRIM DS) 800-160 mg per tablet Take 1 tablet by mouth 2 (two) times a day for 7 days smx-tmp DS (BACTRIM) 800-160 mg tabs (1tab q12 D10), Starting Mon 5/19/2025, Until Mon 5/26/2025, Normal           CONTINUE these medications which have NOT CHANGED    Details   apixaban (Eliquis) 5 mg Take 5 mg by mouth 2 (two) times a day, Historical Med      aspirin (ECOTRIN LOW STRENGTH) 81 mg EC tablet Take 81 mg by mouth daily, Historical Med      atorvastatin (LIPITOR) 40 mg tablet Take 80 mg by mouth in the morning., Historical Med      digoxin (LANOXIN) 0.125 mg tablet Take 250 mcg by mouth in the morning., Historical Med      lisinopril (ZESTRIL) 5 mg tablet Take 5 mg by mouth daily, Historical Med      metoprolol succinate (TOPROL-XL) 100 mg 24 hr tablet Take 100 mg by mouth daily 1.5 in am and 1 at night, Historical Med      Omega-3 Fatty Acids (Fish Oil) 1200 MG CAPS Take by mouth 2 (two) times a day, Historical Med      oxyCODONE-acetaminophen (PERCOCET)  mg per tablet Take 1 tablet by mouth every 6 (six) hours as needed, Historical Med      warfarin (COUMADIN) 2.5 mg tablet Take by mouth daily 1-2 a day as directed by MD, Historical Med           No discharge procedures on file.  ED SEPSIS DOCUMENTATION   Time reflects when diagnosis was documented in both MDM as applicable and the Disposition within this note       Time User Action Codes Description Comment    5/19/2025  5:04 PM Wandy Kinsey [N49.2] Scrotal abscess     5/19/2025  5:04 PM Wandy Kinsey Add [L02.91] Abscess     5/19/2025  5:04 PM Wandy Kinsey [L03.90] Cellulitis                      [1]   Social History  Tobacco Use    Smoking status: Every Day     Types: Cigars    Smokeless tobacco: Never   Vaping Use    Vaping status: Never Used   Substance Use Topics    Alcohol use: Not Currently    Drug use: Never        Wandy Kinsey  DO  05/19/25 1746

## 2025-05-19 NOTE — DISCHARGE INSTRUCTIONS
Patient Education     Abscess Incision and Drainage ED   General Information   You came to the Emergency Department (ED) for an abscess. This is a collection of pus under your skin. The doctors made a cut to open up the area and clean out the wound. Most of the time, the cut is not closed with stitches. The wound may be packed with a special gauze to keep it from closing too soon. Sometimes a small drain is placed.  What care is needed at home?   Call your regular doctor to let them know you were in the ED. Make a follow-up appointment in 1 to 2 days to have them look at the wound.  Keep your wound covered with gauze until it is fully healed.  If you do not have any packing, soak the wound in warm soapy water a few times each day.  If you have packing, do not soak the wound. Your regular doctor will change the packing during your appointment. This will need to be repeated until the wound stops draining. Then you can begin soaking it in warm soapy water a few times each day.  If you have a drain in place, the doctor will give you special instructions on how to care for the drain.  Keep your wound dry unless you are soaking it in warm soapy water as instructed.  If the doctors order an antibiotic for you, be sure to take all of them, even if you start to feel better.  When do I need to get emergency help?   Return to the ED if:   You get a fever of 100.4°F (38°C) or higher and have a red rash all over your body with red eyes, diarrhea, and/or mouth sores.  You are confused or very sleepy.  When do I need to call the doctor?   To have your packing changed if your wound is packed.  You have a fever of 100.4°F (38°C) or higher or chills.  Your wound is swollen, red, or warm.  Your wound has thick yellow or green drainage.  You have new or worsening symptoms.  Last Reviewed Date   2021-05-14  Consumer Information Use and Disclaimer   This generalized information is a limited summary of diagnosis, treatment, and/or  medication information. It is not meant to be comprehensive and should be used as a tool to help the user understand and/or assess potential diagnostic and treatment options. It does NOT include all information about conditions, treatments, medications, side effects, or risks that may apply to a specific patient. It is not intended to be medical advice or a substitute for the medical advice, diagnosis, or treatment of a health care provider based on the health care provider's examination and assessment of a patient’s specific and unique circumstances. Patients must speak with a health care provider for complete information about their health, medical questions, and treatment options, including any risks or benefits regarding use of medications. This information does not endorse any treatments or medications as safe, effective, or approved for treating a specific patient. [employer] and its affiliates disclaim any warranty or liability relating to this information or the use thereof. The use of this information is governed by the Terms of Use, available at https://www.[employer].com/en/know/clinical-effectiveness-terms   Copyright   Copyright © 2024 [employer] and its affiliates and/or licensors. All rights reserved.

## 2025-05-20 ENCOUNTER — TELEPHONE (OUTPATIENT)
Age: 65
End: 2025-05-20

## 2025-05-20 NOTE — TELEPHONE ENCOUNTER
New Patient      Insurance   Current Insurance? Medicare A+B/ [employer]    Insurance E-verified?     History   Reason for appointment/active symptoms?  scrotal abscess     Has the patient had any previous Urologist(s)? no      Was the patient seen in the ED? 05/19     Labs/Imaging(Including Out Of Network)? U/S      Records Requested?   Records Visible in EPIC? yes     Personal history of cancer?      Appointment   Office location preference:    ?   Appointment Details   Date:    Time:    Location:    Provider:    Does the appointment need further review? Please review for sooner appt, soonest available was for mid June   PT CB: [phone]